# Patient Record
Sex: MALE | Race: WHITE | URBAN - METROPOLITAN AREA
[De-identification: names, ages, dates, MRNs, and addresses within clinical notes are randomized per-mention and may not be internally consistent; named-entity substitution may affect disease eponyms.]

---

## 2019-02-21 ENCOUNTER — APPOINTMENT (OUTPATIENT)
Dept: URBAN - METROPOLITAN AREA SURGERY 9 | Age: 74
Setting detail: DERMATOLOGY
End: 2019-02-21

## 2019-02-21 VITALS — SYSTOLIC BLOOD PRESSURE: 130 MMHG | RESPIRATION RATE: 14 BRPM | DIASTOLIC BLOOD PRESSURE: 90 MMHG | HEART RATE: 60 BPM

## 2019-02-21 PROBLEM — C44.319 BASAL CELL CARCINOMA OF SKIN OF OTHER PARTS OF FACE: Status: ACTIVE | Noted: 2019-02-21

## 2019-02-21 PROBLEM — Z85.828 PERSONAL HISTORY OF OTHER MALIGNANT NEOPLASM OF SKIN: Status: ACTIVE | Noted: 2019-02-21

## 2019-02-21 PROCEDURE — OTHER CONSULTATION FOR MOHS SURGERY: OTHER

## 2019-02-21 PROCEDURE — 17311 MOHS 1 STAGE H/N/HF/G: CPT

## 2019-02-21 PROCEDURE — OTHER MOHS SURGERY: OTHER

## 2019-02-21 PROCEDURE — 17312 MOHS ADDL STAGE: CPT

## 2019-02-21 PROCEDURE — OTHER RETURN TO REFERRING PROVIDER: OTHER

## 2019-02-21 NOTE — PROCEDURE: CONSULTATION FOR MOHS SURGERY
Detail Level: Detailed
Name Of The Referring Provider For Procedure: [facility]
Anatomic Location From Referring Provider: left temple
Incorporate Mauc In Note: No
X Size Of Lesion In Cm (Optional): 0

## 2024-12-12 NOTE — PROCEDURE: MOHS SURGERY
Appointment for: Rosa Mensah ([id number])  Visit type: PC ACUTE (5633)  12/12/2024 9:00 AM (30 minutes) with Radha Giraldo NP in [facility]     Patient comments:  Pain in legs   Inflammation Suggestive Of Cancer Camouflage Histology Text: There was a dense lymphocytic infiltrate which prevented adequate histologic evaluation of adjacent structures.